# Patient Record
Sex: FEMALE | Race: WHITE | URBAN - NONMETROPOLITAN AREA
[De-identification: names, ages, dates, MRNs, and addresses within clinical notes are randomized per-mention and may not be internally consistent; named-entity substitution may affect disease eponyms.]

---
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
## 2022-09-07 NOTE — PLAN OF CARE
A ([employer] Access [employer] Dbl) catheter was used to non-selectively engage and inject the left atrium by hand injection.  Face to face end of shift report received from Mark AMBROCIO RN. Rounding completed. Patient observed in the lounge.     Debo Little  5/1/2017  4:46 PM